# Patient Record
Sex: FEMALE | Race: OTHER | NOT HISPANIC OR LATINO | URBAN - METROPOLITAN AREA
[De-identification: names, ages, dates, MRNs, and addresses within clinical notes are randomized per-mention and may not be internally consistent; named-entity substitution may affect disease eponyms.]

---

## 2019-12-06 ENCOUNTER — EMERGENCY (EMERGENCY)
Facility: HOSPITAL | Age: 64
LOS: 1 days | Discharge: HOME | End: 2019-12-06
Admitting: EMERGENCY MEDICINE
Payer: SELF-PAY

## 2019-12-06 VITALS
HEIGHT: 68 IN | TEMPERATURE: 98 F | RESPIRATION RATE: 18 BRPM | WEIGHT: 182.1 LBS | SYSTOLIC BLOOD PRESSURE: 153 MMHG | DIASTOLIC BLOOD PRESSURE: 95 MMHG | HEART RATE: 87 BPM | OXYGEN SATURATION: 100 %
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
## 2019-12-06 DIAGNOSIS — S16.1XXA STRAIN OF MUSCLE, FASCIA AND TENDON AT NECK LEVEL, INITIAL ENCOUNTER: ICD-10-CM
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
## 2019-12-06 DIAGNOSIS — M43.6 TORTICOLLIS: ICD-10-CM
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
## 2019-12-06 DIAGNOSIS — M54.2 CERVICALGIA: ICD-10-CM
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
## 2019-12-06 PROCEDURE — 96372 THER/PROPH/DIAG INJ SC/IM: CPT

## 2019-12-06 PROCEDURE — 99283 EMERGENCY DEPT VISIT LOW MDM: CPT | Mod: 25

## 2019-12-06 PROCEDURE — 99283 EMERGENCY DEPT VISIT LOW MDM: CPT

## 2019-12-06 RX ORDER — KETOROLAC TROMETHAMINE 30 MG/ML
60 SYRINGE (ML) INJECTION ONCE
Refills: 0 | Status: DISCONTINUED | OUTPATIENT
Start: 2019-12-06 | End: 2019-12-06

## 2019-12-06 RX ORDER — DIAZEPAM 5 MG
5 TABLET ORAL ONCE
Refills: 0 | Status: DISCONTINUED | OUTPATIENT
Start: 2019-12-06 | End: 2019-12-06

## 2019-12-06 RX ORDER — TRAMADOL HYDROCHLORIDE 50 MG/1
1 TABLET ORAL
Qty: 9 | Refills: 0
Start: 2019-12-06 | End: 2019-12-08

## 2019-12-06 RX ORDER — LIDOCAINE 4 G/100G
1 CREAM TOPICAL ONCE
Refills: 0 | Status: COMPLETED | OUTPATIENT
Start: 2019-12-06 | End: 2019-12-06

## 2019-12-06 RX ADMIN — Medication 60 MILLIGRAM(S): at 22:39

## 2019-12-06 RX ADMIN — Medication 5 MILLIGRAM(S): at 21:22

## 2019-12-06 RX ADMIN — LIDOCAINE 1 PATCH: 4 CREAM TOPICAL at 21:26

## 2019-12-06 RX ADMIN — Medication 60 MILLIGRAM(S): at 21:26
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
## 2019-12-06 NOTE — ED PROVIDER NOTE - PATIENT PORTAL LINK FT
You can access the FollowMyHealth Patient Portal offered by [facility] by registering at the following website: [email or web address]. By joining [email or web address]’s FollowMyHealth portal, you will also be able to view your health information using other applications (apps) compatible with our system.

## 2019-12-06 NOTE — ED PROVIDER NOTE - CARE PLAN
Principal Discharge DX:	Torticollis, acute Principal Discharge DX:	Cervical strain, acute, initial encounter
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
## 2019-12-06 NOTE — ED PROVIDER NOTE - PHYSICAL EXAMINATION
CONSTITUTIONAL: Well-appearing; well-nourished; in no apparent distress.   HEAD: Normocephalic; atraumatic.   EYES: PERRL; EOM intact; conjunctiva and sclera clear  ENT: normal nose; no rhinorrhea; normal pharynx with no erythema or lesions.   NECK: +tenderness to L cervical paraspinal muscles, difficulty moving head to left, UE strength 5/5   CARDIOVASCULAR: Normal S1, S2; no murmurs, rubs, or gallops. Regular rate and rhythm.   RESPIRATORY: Breathing easily; breath sounds clear and equal bilaterally; no wheezes, rhonchi, or rales.  GI: Soft; non-distended; non-tender; no palpable organomegaly.   MSK: FROM at all extremities, normal tone   EXT: No cyanosis or edema; N/V intact  SKIN: Normal for age and race; warm; dry; good turgor; no apparent lesions or rash.   NEURO: A & O x 3; face symmetric; grossly unremarkable.   PSYCHOLOGICAL: The patient’s mood and manner are appropriate.
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
## 2022-08-23 PROBLEM — Z00.00 ENCOUNTER FOR PREVENTIVE HEALTH EXAMINATION: Status: ACTIVE | Noted: 2022-08-23

## 2022-09-19 ENCOUNTER — APPOINTMENT (OUTPATIENT)
Dept: OTOLARYNGOLOGY | Facility: CLINIC | Age: 67
End: 2022-09-19

## 2022-09-19 VITALS
BODY MASS INDEX: 28.04 KG/M2 | HEIGHT: 68 IN | HEART RATE: 76 BPM | SYSTOLIC BLOOD PRESSURE: 171 MMHG | TEMPERATURE: 97.4 F | WEIGHT: 185 LBS | DIASTOLIC BLOOD PRESSURE: 110 MMHG

## 2022-09-19 PROCEDURE — 99204 OFFICE O/P NEW MOD 45 MIN: CPT

## 2022-09-19 NOTE — HISTORY OF PRESENT ILLNESS
[de-identified] : 67F here for initial evaluation.\par \par Over the past 7-8 months or so, she reports a lump over her left neck. She initially felt it while putting on cream and since then has slowly enlarged. There are no other sx - there is no pain, no weight loss, no difficulty eating, breathing, swallowing or talking.\par Social etoh, no tobacco.\par \par CT Neck 6/1/22 (I reviewed imaging):\par -Approximate 1.3 cm x 0.8 cm x 1.2 cm, partially calcified, heterogeneous density, possible exophytic, inferior left parotid gland pleomorphic adenoma or Warthin's tumor versus abnormal, left level 3 lymph node.\par \par FNA, Left Neck Mass, 8/2/22:\par -oncocytic neoplasm, differential diagnosis includes Warthin's tumor, oncocytoma and salivary duct carcinoma. \par \par ROS otherwise unremarkable.

## 2022-09-19 NOTE — PHYSICAL EXAM
[de-identified] : ~1.5cm firm, nontender mass in left parotid tail/level 1b region. no overlying skin changes and no other LAD or masses/lesions [Midline] : trachea located in midline position [Normal] : no rashes [de-identified] : face symmetric, HB 1/6 b/l

## 2022-09-19 NOTE — CONSULT LETTER
[Dear  ___] : Dear  [unfilled], [Courtesy Letter:] : I had the pleasure of seeing your patient, [unfilled], in my office today. [Consult Closing:] : Thank you very much for allowing me to participate in the care of this patient.  If you have any questions, please do not hesitate to contact me. [Sincerely,] : Sincerely, [FreeTextEntry3] : Andrzej Yee MD\par Department of Otolaryngology - Head and Neck Surgery\par [facility]

## 2022-09-19 NOTE — ASSESSMENT
[FreeTextEntry1] : 67F here for initial evaluation. Over the past 7-8 months or so, she reports a lump over her left neck. She initially felt it while putting on cream and since then has slowly enlarged. There are no other sx - there is no pain, no weight loss, no difficulty eating, breathing, swallowing or talking. CT neck shows a 1.3cm, partially calcified, heterogeneous density, possible exophytic, inferior left parotid gland lesion versus abnormal, left level 3 lymph node. FNA of the left neck shows an oncocytic neoplasm, as above. 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 This is most likely benign given exam and imaging. Next step is for left parotidectomy - this will establish tissue diagnosis which will then determine definitive treatment, which is most likely complete surgical excision alone. The procedure was discussed at length including all risks and benefits and all questions answered. Plan for OR in the next 1-2 months.\par

## 2022-09-19 NOTE — DATA REVIEWED
[de-identified] : CT Neck withOUT contrast:\par FINDINGS:  Patient refused intravenous contrast.\par Unenhanced CT scan of the neck is limited for evaluation small neck mass lesions and small necrotic cervical lymph nodes.\par \par Marker was placed over the region of the patient's clinical symptoms.\par Marker corresponds to the left level 3 neck.\par In this region marked, there is an approximate 1.3 cm CC by 0.8 cm AP by 1.2 cm transverse dimension, nodule abutting the inferior aspect of the left parotid gland.\par Punctate calcification is seen in the posterolateral left side.\par It is intrinsic heterogeneous hypodensity.\par Differential considerations would include exophytic, inferior left parotid gland possible pleomorphic adenoma or Warthin's tumor versus abnormal left level 3 lymph node.\par Fine-needle biopsy is suggested for further evaluation, as warranted clinically.\par \par No other neck mass lesions or enlarged lymph nodes in region marked.\par \par Examination the remaining unenhanced soft tissue neck demonstrates no other neck mass lesions or enlarged lymph nodes.\par \par Remaining parotid, submandibular and thyroid glands are unremarkable.\par \par Moderate air distention of the upper thoracic esophagus is seen. Nonspecific finding and may represent air swallowing versus gastroesophageal reflux.\par \par Small, unopacified, right sided internal laryngocele is present (axial image 38, series 3). No intrinsic laryngeal or paralaryngeal mass lesions identified.\par Remaining unenhanced aerodigestive tract shows no mass lesion.\par There are no mass lesions of the upper mediastinum or lung apices.\par \par Mild, bilateral maxillary sinus mucosal thickening is present.\par Remaining included paranasal sinuses, mastoid air cells and middle ear cavities are clear.\par Bilateral frontal sinuses are hypoplastic.\par No destructive osseous lesions of the skull base or regional spine.\par \par IMPRESSION:  \par 1. Approximate 1.3 cm x 0.8 cm x 1.2 cm, partially calcified, heterogeneous density, possible exophytic, inferior left parotid gland pleomorphic adenoma or Warthin's tumor versus abnormal, left level 3 lymph node.\par Fine-needle biopsy is suggested for further evaluation, as warranted clinically.\par

## 2022-11-07 ENCOUNTER — LABORATORY RESULT (OUTPATIENT)
Age: 67
End: 2022-11-07

## 2022-11-08 VITALS
DIASTOLIC BLOOD PRESSURE: 83 MMHG | RESPIRATION RATE: 16 BRPM | HEIGHT: 68 IN | TEMPERATURE: 98 F | HEART RATE: 83 BPM | SYSTOLIC BLOOD PRESSURE: 143 MMHG | WEIGHT: 183.87 LBS | OXYGEN SATURATION: 99 %

## 2022-11-08 NOTE — PRE-OP CHECKLIST - 2.
Dr. Yee examined the patient, and after examining the patient, the surgery was cancelled pending further testing that will be done.  Everything was explained by the doctor to the patient and the patient left the hospital to home with her  at 3:18 pm.

## 2022-11-08 NOTE — PRE-OP CHECKLIST - SELECT TESTS ORDERED
PT/PTT/INR/EKG/CXR/COVID-19 CBC/CMP/PT/PTT/INR/EKG/CXR/COVID-19 [id number]/CBC/CMP/PT/PTT/INR/EKG/CXR/COVID-19

## 2022-11-09 ENCOUNTER — OUTPATIENT (OUTPATIENT)
Dept: OUTPATIENT SERVICES | Facility: HOSPITAL | Age: 67
LOS: 1 days | End: 2022-11-09

## 2022-11-09 ENCOUNTER — APPOINTMENT (OUTPATIENT)
Dept: OTOLARYNGOLOGY | Facility: HOSPITAL | Age: 67
End: 2022-11-09

## 2022-11-09 DIAGNOSIS — Z90.710 ACQUIRED ABSENCE OF BOTH CERVIX AND UTERUS: Chronic | ICD-10-CM

## 2022-11-09 DIAGNOSIS — Z98.890 OTHER SPECIFIED POSTPROCEDURAL STATES: Chronic | ICD-10-CM

## 2022-11-09 DIAGNOSIS — Z90.49 ACQUIRED ABSENCE OF OTHER SPECIFIED PARTS OF DIGESTIVE TRACT: Chronic | ICD-10-CM

## 2022-11-09 RX ORDER — AMLODIPINE BESYLATE 2.5 MG/1
1 TABLET ORAL
Qty: 0 | Refills: 0 | DISCHARGE

## 2022-11-25 PROBLEM — I10 ESSENTIAL (PRIMARY) HYPERTENSION: Chronic | Status: ACTIVE | Noted: 2022-11-08

## 2022-11-25 PROBLEM — E78.5 HYPERLIPIDEMIA, UNSPECIFIED: Chronic | Status: ACTIVE | Noted: 2022-11-08

## 2022-11-25 PROBLEM — D64.9 ANEMIA, UNSPECIFIED: Chronic | Status: ACTIVE | Noted: 2022-11-08

## 2022-11-25 PROBLEM — R73.03 PREDIABETES: Chronic | Status: ACTIVE | Noted: 2022-11-08

## 2022-11-26 ENCOUNTER — APPOINTMENT (OUTPATIENT)
Dept: MRI IMAGING | Facility: HOSPITAL | Age: 67
End: 2022-11-26

## 2022-11-26 ENCOUNTER — OUTPATIENT (OUTPATIENT)
Dept: OUTPATIENT SERVICES | Facility: HOSPITAL | Age: 67
LOS: 1 days | End: 2022-11-26
Payer: MEDICARE

## 2022-11-26 DIAGNOSIS — Z90.49 ACQUIRED ABSENCE OF OTHER SPECIFIED PARTS OF DIGESTIVE TRACT: Chronic | ICD-10-CM

## 2022-11-26 DIAGNOSIS — Z90.710 ACQUIRED ABSENCE OF BOTH CERVIX AND UTERUS: Chronic | ICD-10-CM

## 2022-11-26 DIAGNOSIS — Z98.890 OTHER SPECIFIED POSTPROCEDURAL STATES: Chronic | ICD-10-CM

## 2022-11-26 PROCEDURE — 70540 MRI ORBIT/FACE/NECK W/O DYE: CPT

## 2022-11-26 PROCEDURE — 70540 MRI ORBIT/FACE/NECK W/O DYE: CPT | Mod: 26

## 2024-04-01 ENCOUNTER — APPOINTMENT (OUTPATIENT)
Dept: OTOLARYNGOLOGY | Facility: CLINIC | Age: 69
End: 2024-04-01
Payer: MEDICARE

## 2024-04-01 VITALS — WEIGHT: 190 LBS | BODY MASS INDEX: 28.79 KG/M2 | HEIGHT: 68 IN

## 2024-04-01 PROCEDURE — 99213 OFFICE O/P EST LOW 20 MIN: CPT

## 2024-04-01 NOTE — CONSULT LETTER
[Dear  ___] : Dear  [unfilled], [Courtesy Letter:] : I had the pleasure of seeing your patient, [unfilled], in my office today. [Sincerely,] : Sincerely, [Consult Closing:] : Thank you very much for allowing me to participate in the care of this patient.  If you have any questions, please do not hesitate to contact me. [FreeTextEntry3] : Andrzej Yee MD\par  Department of Otolaryngology - Head and Neck Surgery\par  [facility]

## 2024-04-01 NOTE — HISTORY OF PRESENT ILLNESS
[de-identified] : 68F here in followup.  Over the past 1-2 months or so, she feels the lesion in her neck has slightly enlarged. 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 The neck had remained flat until the past 1-2 months where she feels the lesion again. There are no other sx - there is no pain, no weight loss, no difficulty eating, breathing, swallowing or talking. Social etoh, no tobacco.  MRI Neck 11/2022: -Within the left parotid tail there is a 0.8 x 0.5 x 0.5 cm nodule which is hypointense on T1 and isointense on STIR.  CT Neck 6/1/22 (I reviewed imaging): -Approximate 1.3 cm x 0.8 cm x 1.2 cm, partially calcified, heterogeneous density, possible exophytic, inferior left parotid gland pleomorphic adenoma or Warthin's tumor versus abnormal, left level 3 lymph node.  FNA, Left Neck Mass, 8/2/22: -oncocytic neoplasm, differential diagnosis includes Warthin's tumor, oncocytoma and salivary duct carcinoma.   ROS otherwise unremarkable.

## 2024-04-01 NOTE — PHYSICAL EXAM
[de-identified] : ~1.5cm firm, nontender mass in left parotid tail/level 1b region. no overlying skin changes and no other LAD or masses/lesions [Midline] : trachea located in midline position [Normal] : orientation to person, place, and time: normal [de-identified] : face symmetric, HB 1/6 b/l

## 2024-04-01 NOTE — ASSESSMENT
[FreeTextEntry1] : 68F here in followup. Over the past 1-2 months or so, she feels the lesion in her neck has slightly enlarged. 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 The neck had remained flat since then until the past 1-2 months where she feels the lesion again. Initial CT neck shows a 1.3cm, partially calcified, heterogeneous density, possible exophytic, inferior left parotid gland lesion. FNA of the left neck shows an oncocytic neoplasm, as above. On exam, there is a 1.5cm firm, nontender mass in the left parotid tail with no overlying skin changes and no other masses/lesions. 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 It is quite palpable on exam today and is c/w benign parotid tail neoplasm. At this point, definitive treatment is complete excision. She is going on vacation next month - will RTO when she returns in two months; should it remain, will proceed w parotidectomy.

## 2024-04-01 NOTE — DATA REVIEWED
[de-identified] : MRI Neck 11/26/23: FINDINGS: AERODIGESTIVE TRACT: Symmetric without suspicious intrinsic or extrinsic soft tissue or mass effect. LYMPH NODES:  There are no enlarged cervical lymph nodes. PAROTID GLANDS: Marker is placed superficial to the following: within the left parotid tail there is a 0.8 x 0.5 x 0.5 cm (transverse x AP x CC) nodule which is hypointense on T1 and isointense on STIR. It is not clearly visible on DWI. The right parotid gland and remaining left parotid gland show no additional nodule with preserved T1 hyperintense signal. No acute inflammatory signal or ductal dilatation. Preserved fat signal seen at the digastric grooves without visible widening of the facial canals. SUBMANDIBULAR GLANDS:  Unremarkable. THYROID GLAND:  No mass. BONES:  No marrow replacing tissue or edema signal. There is developmental fusion of the C6 and C7 vertebra. SINONASAL CAVITY AND MASTOIDS: No acute disease. ORBITS: No abnormal soft tissue or mass effect. BRAIN: Included intracranial compartment is unremarkable.   IMPRESSION:  Subcentimeter (8 x 5 mm) nodule in the left parotid tail.  CT Neck withOUT contrast: FINDINGS:  Patient refused intravenous contrast. Unenhanced CT scan of the neck is limited for evaluation small neck mass lesions and small necrotic cervical lymph nodes.  Marker was placed over the region of the patient's clinical symptoms. Marker corresponds to the left level 3 neck. In this region marked, there is an approximate 1.3 cm CC by 0.8 cm AP by 1.2 cm transverse dimension, nodule abutting the inferior aspect of the left parotid gland. Punctate calcification is seen in the posterolateral left side. It is intrinsic heterogeneous hypodensity. Differential considerations would include exophytic, inferior left parotid gland possible pleomorphic adenoma or Warthin's tumor versus abnormal left level 3 lymph node. Fine-needle biopsy is suggested for further evaluation, as warranted clinically.  No other neck mass lesions or enlarged lymph nodes in region marked.  Examination the remaining unenhanced soft tissue neck demonstrates no other neck mass lesions or enlarged lymph nodes.  Remaining parotid, submandibular and thyroid glands are unremarkable.  Moderate air distention of the upper thoracic esophagus is seen. Nonspecific finding and may represent air swallowing versus gastroesophageal reflux.  Small, unopacified, right sided internal laryngocele is present (axial image 38, series 3). No intrinsic laryngeal or paralaryngeal mass lesions identified. Remaining unenhanced aerodigestive tract shows no mass lesion. There are no mass lesions of the upper mediastinum or lung apices.  Mild, bilateral maxillary sinus mucosal thickening is present. Remaining included paranasal sinuses, mastoid air cells and middle ear cavities are clear. Bilateral frontal sinuses are hypoplastic. No destructive osseous lesions of the skull base or regional spine.  IMPRESSION:   1. Approximate 1.3 cm x 0.8 cm x 1.2 cm, partially calcified, heterogeneous density, possible exophytic, inferior left parotid gland pleomorphic adenoma or Warthin's tumor versus abnormal, left level 3 lymph node. Fine-needle biopsy is suggested for further evaluation, as warranted clinically.

## 2024-06-04 ENCOUNTER — APPOINTMENT (OUTPATIENT)
Dept: OTOLARYNGOLOGY | Facility: CLINIC | Age: 69
End: 2024-06-04
Payer: MEDICARE

## 2024-06-04 DIAGNOSIS — R22.1 LOCALIZED SWELLING, MASS AND LUMP, NECK: ICD-10-CM

## 2024-06-04 DIAGNOSIS — D49.0 NEOPLASM OF UNSPECIFIED BEHAVIOR OF DIGESTIVE SYSTEM: ICD-10-CM

## 2024-06-04 PROCEDURE — 99213 OFFICE O/P EST LOW 20 MIN: CPT

## 2024-06-04 NOTE — HISTORY OF PRESENT ILLNESS
[de-identified] : 68F here in followup.  Over the past 4 months or so, she feels the lesion in her neck has slightly enlarged. 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 The neck had remained flat until the past 1-2 months where she feels the lesion again. There are no other sx - there is no pain, no weight loss, no difficulty eating, breathing, swallowing or talking. Social etoh, no tobacco.  MRI Neck 11/2022: -Within the left parotid tail there is a 0.8 x 0.5 x 0.5 cm nodule which is hypointense on T1 and isointense on STIR.  CT Neck 6/1/22 (I reviewed imaging): -Approximate 1.3 cm x 0.8 cm x 1.2 cm, partially calcified, heterogeneous density, possible exophytic, inferior left parotid gland pleomorphic adenoma or Warthin's tumor versus abnormal, left level 3 lymph node.  FNA, Left Neck Mass, 8/2/22: -oncocytic neoplasm, differential diagnosis includes Warthin's tumor, oncocytoma and salivary duct carcinoma.   ROS otherwise unremarkable.

## 2024-06-04 NOTE — ASSESSMENT
[FreeTextEntry1] : 68F here in followup. Over the past 4 months or so, she feels the lesion in her neck has slightly enlarged. 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 The neck had remained flat since then until the past 1-2 months where she feels the lesion again. Initial CT neck shows a 1.3cm, partially calcified, heterogeneous density, possible exophytic, inferior left parotid gland lesion. FNA of the left neck shows an oncocytic neoplasm, as above. On exam, there is a 1.5cm firm, nontender mass in the left parotid tail with no overlying skin changes and no other masses/lesions. 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 It remains palpable on exam today and is c/w benign parotid tail neoplasm. At this point, definitive treatment is complete excision. I reviewed the procedure at length and all questions answered. Plan for OR in the next 2-3 months or so.

## 2024-06-04 NOTE — PHYSICAL EXAM
[de-identified] : ~1.5cm firm, nontender mass in left parotid tail/level 1b region. no overlying skin changes and no other LAD or masses/lesions [Midline] : trachea located in midline position [Normal] : no rashes [de-identified] : face symmetric, HB 1/6 b/l

## 2024-06-04 NOTE — DATA REVIEWED
[de-identified] : MRI Neck 11/26/23: FINDINGS: AERODIGESTIVE TRACT: Symmetric without suspicious intrinsic or extrinsic soft tissue or mass effect. LYMPH NODES:  There are no enlarged cervical lymph nodes. PAROTID GLANDS: Marker is placed superficial to the following: within the left parotid tail there is a 0.8 x 0.5 x 0.5 cm (transverse x AP x CC) nodule which is hypointense on T1 and isointense on STIR. It is not clearly visible on DWI. The right parotid gland and remaining left parotid gland show no additional nodule with preserved T1 hyperintense signal. No acute inflammatory signal or ductal dilatation. Preserved fat signal seen at the digastric grooves without visible widening of the facial canals. SUBMANDIBULAR GLANDS:  Unremarkable. THYROID GLAND:  No mass. BONES:  No marrow replacing tissue or edema signal. There is developmental fusion of the C6 and C7 vertebra. SINONASAL CAVITY AND MASTOIDS: No acute disease. ORBITS: No abnormal soft tissue or mass effect. BRAIN: Included intracranial compartment is unremarkable.   IMPRESSION:  Subcentimeter (8 x 5 mm) nodule in the left parotid tail.  CT Neck withOUT contrast: FINDINGS:  Patient refused intravenous contrast. Unenhanced CT scan of the neck is limited for evaluation small neck mass lesions and small necrotic cervical lymph nodes.  Marker was placed over the region of the patient's clinical symptoms. Marker corresponds to the left level 3 neck. In this region marked, there is an approximate 1.3 cm CC by 0.8 cm AP by 1.2 cm transverse dimension, nodule abutting the inferior aspect of the left parotid gland. Punctate calcification is seen in the posterolateral left side. It is intrinsic heterogeneous hypodensity. Differential considerations would include exophytic, inferior left parotid gland possible pleomorphic adenoma or Warthin's tumor versus abnormal left level 3 lymph node. Fine-needle biopsy is suggested for further evaluation, as warranted clinically.  No other neck mass lesions or enlarged lymph nodes in region marked.  Examination the remaining unenhanced soft tissue neck demonstrates no other neck mass lesions or enlarged lymph nodes.  Remaining parotid, submandibular and thyroid glands are unremarkable.  Moderate air distention of the upper thoracic esophagus is seen. Nonspecific finding and may represent air swallowing versus gastroesophageal reflux.  Small, unopacified, right sided internal laryngocele is present (axial image 38, series 3). No intrinsic laryngeal or paralaryngeal mass lesions identified. Remaining unenhanced aerodigestive tract shows no mass lesion. There are no mass lesions of the upper mediastinum or lung apices.  Mild, bilateral maxillary sinus mucosal thickening is present. Remaining included paranasal sinuses, mastoid air cells and middle ear cavities are clear. Bilateral frontal sinuses are hypoplastic. No destructive osseous lesions of the skull base or regional spine.  IMPRESSION:   1. Approximate 1.3 cm x 0.8 cm x 1.2 cm, partially calcified, heterogeneous density, possible exophytic, inferior left parotid gland pleomorphic adenoma or Warthin's tumor versus abnormal, left level 3 lymph node. Fine-needle biopsy is suggested for further evaluation, as warranted clinically.

## 2024-06-04 NOTE — CONSULT LETTER
[Dear  ___] : Dear  [unfilled], [Courtesy Letter:] : I had the pleasure of seeing your patient, [unfilled], in my office today. [Consult Closing:] : Thank you very much for allowing me to participate in the care of this patient.  If you have any questions, please do not hesitate to contact me. [Sincerely,] : Sincerely, [FreeTextEntry3] : Andrzej Yee MD\par  Department of Otolaryngology - Head and Neck Surgery\par  [facility]

## 2024-08-14 ENCOUNTER — APPOINTMENT (OUTPATIENT)
Dept: OTOLARYNGOLOGY | Facility: HOSPITAL | Age: 69
End: 2024-08-14